# Patient Record
Sex: FEMALE | Race: WHITE | NOT HISPANIC OR LATINO | URBAN - METROPOLITAN AREA
[De-identification: names, ages, dates, MRNs, and addresses within clinical notes are randomized per-mention and may not be internally consistent; named-entity substitution may affect disease eponyms.]

---

## 2017-11-01 NOTE — PROCEDURE NOTE: CLINICAL
PROCEDURE NOTE: Eylea #1 OD. Diagnosis: Neovascular AMD with Active CNV. Prep: Betadine Drops and Betadine Scrub. Prior to injection, risks/benefits/alternatives discussed including corneal abrasion, infection, loss of vision, hemorrhage, cataract, glaucoma, retinal tears or detachment. A written consent is on file, and the need for today’s injection was discussed and the patient is understanding and wishes to proceed. The entire vial of Eylea was drawn up into a syringe. The opened vial, remaining drug, and filtered needle were disposed of in a certified biohazard container. Betadine prep was performed. Topical anesthesia was induced with Alcaine. 4% lidocaine pledge. A lid speculum was used. A short 30g needle on a 1cc syringe was used with product that that had previously been prepared under sterile conditions. Injection site: 3-4 mm from the limbus. The used syringe/needle was transferred to a biohazard container. Lid speculum removed. Mask worn during procedure. Patient tolerated procedure well. Count fingers vision was verified. There were no complications. Patient was given the standard instruction sheet. Patient given office phone number/answering service number and advised to call immediately should there be loss of vision or pain, or should they have any other questions or concerns. Alyson Amin

## 2018-02-20 NOTE — PROCEDURE NOTE: CLINICAL
PROCEDURE NOTE: Eylea #2 OD. Diagnosis: Neovascular AMD with Active CNV. Prep: Betadine Drops and Betadine Scrub. Prior to injection, risks/benefits/alternatives discussed including corneal abrasion, infection, loss of vision, hemorrhage, cataract, glaucoma, retinal tears or detachment. A written consent is on file, and the need for today’s injection was discussed and the patient is understanding and wishes to proceed. The entire vial of Eylea was drawn up into a syringe. The opened vial, remaining drug, and filtered needle were disposed of in a certified biohazard container. Betadine prep was performed. Topical anesthesia was induced with Alcaine. 4% lidocaine pledge. A lid speculum was used. A short 30g needle on a 1cc syringe was used with product that that had previously been prepared under sterile conditions. Injection site: 3-4 mm from the limbus. The used syringe/needle was transferred to a biohazard container. Lid speculum removed. Mask worn during procedure. Patient tolerated procedure well. Count fingers vision was verified. There were no complications. Patient was given the standard instruction sheet. Patient given office phone number/answering service number and advised to call immediately should there be loss of vision or pain, or should they have any other questions or concerns. Aniceto Yates

## 2018-02-20 NOTE — PATIENT DISCUSSION
stable, Recommended Eylea# 2 injection today. r/b/as  discussed with pt, pt elects to proceed  today.

## 2018-04-03 NOTE — PATIENT DISCUSSION
stable, Recommended Eylea# 2 injection today. r/b/as  discussed with pt, pt elects to proceed  today. NO NEW srf/IRF STABLE PED.

## 2018-04-03 NOTE — PROCEDURE NOTE: CLINICAL
PROCEDURE NOTE: Eylea #3 OD. Diagnosis: Neovascular AMD with Active CNV. Prep: Betadine Drops and Betadine Scrub. Prior to injection, risks/benefits/alternatives discussed including corneal abrasion, infection, loss of vision, hemorrhage, cataract, glaucoma, retinal tears or detachment. A written consent is on file, and the need for today’s injection was discussed and the patient is understanding and wishes to proceed. The entire vial of Eylea was drawn up into a syringe. The opened vial, remaining drug, and filtered needle were disposed of in a certified biohazard container. Betadine prep was performed. Topical anesthesia was induced with Alcaine. 4% lidocaine pledge. A lid speculum was used. A short 30g needle on a 1cc syringe was used with product that that had previously been prepared under sterile conditions. Injection site: 3-4 mm from the limbus. The used syringe/needle was transferred to a biohazard container. Lid speculum removed. Mask worn during procedure. Patient tolerated procedure well. Count fingers vision was verified. There were no complications. Patient was given the standard instruction sheet. Patient given office phone number/answering service number and advised to call immediately should there be loss of vision or pain, or should they have any other questions or concerns. Chela Negron

## 2018-05-15 NOTE — PROCEDURE NOTE: CLINICAL
PROCEDURE NOTE: Eylea #4 OD. Diagnosis: Neovascular AMD with Active CNV. Prior to injection, risks/benefits/alternatives discussed including corneal abrasion, infection, loss of vision, hemorrhage, cataract, glaucoma, retinal tears or detachment. A written consent is on file, and the need for today’s injection was discussed and the patient is understanding and wishes to proceed. The entire vial of Eylea was drawn up into a syringe. The opened vial, remaining drug, and filtered needle were disposed of in a certified biohazard container. Betadine prep was performed. Topical anesthesia was induced with Alcaine. 4% lidocaine pledge. A lid speculum was used. A short 30g needle on a 1cc syringe was used with product that that had previously been prepared under sterile conditions. Injection site: 3-4 mm from the limbus. The used syringe/needle was transferred to a biohazard container. Lid speculum removed. Mask worn during procedure. Patient tolerated procedure well. Count fingers vision was verified. There were no complications. Patient was given the standard instruction sheet. Patient given office phone number/answering service number and advised to call immediately should there be loss of vision or pain, or should they have any other questions or concerns. Chela Negron

## 2018-05-15 NOTE — PATIENT DISCUSSION
Matheus #4 injection recommended today after discussion of benefits, risks, alternatives. The patient elects to proceed. The injection was administered without complication. Post-injection instructions were reviewed and understood by the patient.

## 2018-05-15 NOTE — PATIENT DISCUSSION
No new subretinal or intraretinal fluid seen on today's examination and diagnostics. We recommended extending injection interval out to 8-9 weeks.

## 2018-07-10 NOTE — PATIENT DISCUSSION
Leroya #5 injection recommended today after discussion of benefits, risks, alternatives. The patient elects to proceed. The injection was administered without complication. Post-injection instructions were reviewed and understood by the patient.

## 2018-07-10 NOTE — PATIENT DISCUSSION
No new subretinal or intraretinal fluid seen on today's examination and diagnostics. We recommended extending injection interval out to 10 weeks.

## 2018-07-10 NOTE — PROCEDURE NOTE: CLINICAL
PROCEDURE NOTE: Eylea #5 OD. Diagnosis: Neovascular AMD with Active CNV. Prep: Betadine Drops and Betadine Scrub. Prior to injection, risks/benefits/alternatives discussed including corneal abrasion, infection, loss of vision, hemorrhage, cataract, glaucoma, retinal tears or detachment. A written consent is on file, and the need for today’s injection was discussed and the patient is understanding and wishes to proceed. The entire vial of Eylea was drawn up into a syringe. The opened vial, remaining drug, and filtered needle were disposed of in a certified biohazard container. Betadine prep was performed. Topical anesthesia was induced with Alcaine. 4% lidocaine pledge. A lid speculum was used. A short 30g needle on a 1cc syringe was used with product that that had previously been prepared under sterile conditions. Injection site: 3-4 mm from the limbus. The used syringe/needle was transferred to a biohazard container. Lid speculum removed. Mask worn during procedure. Patient tolerated procedure well. Count fingers vision was verified. There were no complications. Patient was given the standard instruction sheet. Patient given office phone number/answering service number and advised to call immediately should there be loss of vision or pain, or should they have any other questions or concerns. Sandrine Palma

## 2018-09-18 NOTE — PATIENT DISCUSSION
Eylea  injection recommended today after discussion of benefits, risks, alternatives. The patient elects to proceed. The injection was administered without complication. Post-injection instructions were reviewed and understood by the patient.

## 2018-09-18 NOTE — PATIENT DISCUSSION
No new subretinal or intraretinal fluid seen on today's examination and diagnostics. We recommended continuing treatment interval at 9-10 weeks.  Goal is to maintain current level of vision.

## 2018-09-18 NOTE — PROCEDURE NOTE: CLINICAL
PROCEDURE NOTE: Eylea OD. Diagnosis: Neovascular AMD with Active CNV. Prior to injection, risks/benefits/alternatives discussed including corneal abrasion, infection, loss of vision, hemorrhage, cataract, glaucoma, retinal tears or detachment. A written consent is on file, and the need for today’s injection was discussed and the patient is understanding and wishes to proceed. The entire vial of Eylea was drawn up into a syringe. The opened vial, remaining drug, and filtered needle were disposed of in a certified biohazard container. Betadine prep was performed. Topical anesthesia was induced with Alcaine. 4% lidocaine pledge. A lid speculum was used. A short 30g needle on a 1cc syringe was used with product that that had previously been prepared under sterile conditions. Injection site: 3-4 mm from the limbus. The used syringe/needle was transferred to a biohazard container. Lid speculum removed. Mask worn during procedure. Patient tolerated procedure well. Count fingers vision was verified. There were no complications. Patient was given the standard instruction sheet. Patient given office phone number/answering service number and advised to call immediately should there be loss of vision or pain, or should they have any other questions or concerns. [facility]

## 2018-11-07 NOTE — PROCEDURE NOTE: SURGICAL
<p>Prior to commencing surgery patient identification, surgical procedure, site, and side were confirmed by Dr. Allyson Guallpa. Following topical proparacaine anesthesia, the patient was positioned at the YAG laser, a contact lens coupled to the cornea with methylcellulose and an axial posterior capsulotomy performed without complication using 2.8 Mj x 68. Excess methylcellulose was washed from the eye, one drop of Alphagan was instilled and the patient returned to the holding area having tolerated the procedure well and without complication. </p><p>MRN:[id number]</p>

## 2018-11-27 NOTE — PATIENT DISCUSSION
No new subretinal or intraretinal fluid seen on today's examination and diagnostics. We recommended continuing treatment and extending interval at 11-12 weeks.  Goal is to maintain current level of vision.

## 2018-11-27 NOTE — PROCEDURE NOTE: CLINICAL
PROCEDURE NOTE: Eylea 2mg OD. Diagnosis: Neovascular AMD with Active CNV. Prior to injection, risks/benefits/alternatives discussed including corneal abrasion, infection, loss of vision, hemorrhage, cataract, glaucoma, retinal tears or detachment. A written consent is on file, and the need for today’s injection was discussed and the patient is understanding and wishes to proceed. The entire vial of Eylea was drawn up into a syringe. The opened vial, remaining drug, and filtered needle were disposed of in a certified biohazard container. Betadine prep was performed. Topical anesthesia was induced with Alcaine. 4% lidocaine pledge. A lid speculum was used. A short 30g needle on a 1cc syringe was used with product that that had previously been prepared under sterile conditions. Injection site: 3-4 mm from the limbus. The used syringe/needle was transferred to a biohazard container. Lid speculum removed. Mask worn during procedure. Patient tolerated procedure well. Count fingers vision was verified. There were no complications. Patient was given the standard instruction sheet. Patient given office phone number/answering service number and advised to call immediately should there be loss of vision or pain, or should they have any other questions or concerns. Deirdre Fan

## 2019-02-19 NOTE — PROCEDURE NOTE: CLINICAL
PROCEDURE NOTE: Eylea 2mg OD. Diagnosis: Neovascular AMD with Active CNV. Prior to injection, risks/benefits/alternatives discussed including corneal abrasion, infection, loss of vision, hemorrhage, cataract, glaucoma, retinal tears or detachment. A written consent is on file, and the need for today’s injection was discussed and the patient is understanding and wishes to proceed. The entire vial of Eylea was drawn up into a syringe. The opened vial, remaining drug, and filtered needle were disposed of in a certified biohazard container. Betadine prep was performed. Topical anesthesia was induced with Alcaine. 4% lidocaine pledge. A lid speculum was used. A short 30g needle on a 1cc syringe was used with product that that had previously been prepared under sterile conditions. Injection site: 3-4 mm from the limbus. The used syringe/needle was transferred to a biohazard container. Lid speculum removed. Mask worn during procedure. Patient tolerated procedure well. Count fingers vision was verified. There were no complications. Patient was given the standard instruction sheet. Patient given office phone number/answering service number and advised to call immediately should there be loss of vision or pain, or should they have any other questions or concerns. Anjelica Ramos

## 2019-02-19 NOTE — PATIENT DISCUSSION
Increased intraretinal fluid seen on today's examination and diagnostics, worse. We recommended continuing treatment today and decrease interval to 8-9 weeks, possible repeat injection at that time.

## 2019-02-19 NOTE — PATIENT DISCUSSION
Discussed in detail re: nature of condition, dry vs wet AMD, AREDS.  No evidnece of wet conversion, no SRF/IRF.

## 2019-04-25 NOTE — PATIENT DISCUSSION
Pt edu inactive at this time, no edema or hemorrhage and with comparison from previous OCT's in the past, has been inactive for awhile. Edu pt reason for decreased VA is due to atrophy from underlying Dry AMD which unfortunately cannot be treated. Edu pt that too many treatments can also increase atrophy. Treatment not indicated at this time. Will monitor for now and re-evaluate in 4 weeks. If becomes active again will then consider treatment. Will see pt in [place] for possible same day treatment. Advised pt to call with any vision changes.

## 2019-04-25 NOTE — PATIENT DISCUSSION
May need possible Rx glasses. Advised pt to follow with [address] [place] [address] for possible glasses.

## 2019-06-03 NOTE — PATIENT DISCUSSION
May need possible Rx glasses. Advised pt to follow with [address] [place] [address] for possible glasses.

## 2019-06-03 NOTE — PATIENT DISCUSSION
Pt edu still inactive at this time, no edema or hemorrhage. Edu pt reason for decreased VA is due to atrophy from underlying Dry AMD which unfortunately cannot be treated. Edu pt that too many treatments can also increase atrophy. Treatment not indicated at this time. Will monitor for now and re-evaluate in 6 weeks when pt returns to the [facility], pt prefers [address]. Aware no same day treatment. If becomes active again will then consider treatment. Advised pt to call with any vision changes.

## 2019-07-18 NOTE — PATIENT DISCUSSION
Pt edu still inactive at this time and has been inactive for quite sometime, no edema or hemorrhage. Edu pt reason for decreased VA is due to atrophy from underlying Dry AMD which unfortunately cannot be treated. Edu pt that too many treatments can also increase atrophy. Treatment not indicated at this time. Aware no same day treatment. If becomes active again will then consider treatment. Advised pt to call with any vision changes.

## 2019-07-18 NOTE — PATIENT DISCUSSION
May need possible Rx glasses. Advised pt to follow with [address] [place] [address] for possible glasses.

## 2019-09-05 NOTE — PATIENT DISCUSSION
May need possible Rx glasses. Advised pt to follow with [address] [place] [address] for possible glasses.

## 2019-09-05 NOTE — PATIENT DISCUSSION
Pt edu still inactive at this time and has been inactive for quite some time, no edema or hemorrhage. Edu pt reason for decreased VA is due to atrophy from underlying Dry AMD which unfortunately cannot be treated. Treatment not indicated at this time. If becomes active again will then consider treatment.  Advised pt to call with any vision changes.  Due to AMD will need magnification and BCVA will be limited in glasses from AMD.

## 2019-11-14 NOTE — PATIENT DISCUSSION
Pt edu active again with fluid seen on OCT but not clinically.  Decision to treat was made based on today's clinical findings. Intravitreal anti-VEGF therapy was recommended. Recommended Beovu injection in 2 weeks.  Series of 3 x 4 weeks apart.

## 2019-11-14 NOTE — PATIENT DISCUSSION
May need possible Rx glasses. Advised pt to follow with [address] [place] [address] for possible glasses.

## 2019-11-20 NOTE — PATIENT DISCUSSION
May need possible Rx glasses. Advised pt to follow with [address] [place] [address] for possible glasses.

## 2019-11-20 NOTE — PROCEDURE NOTE: CLINICAL
PROCEDURE NOTE: Intravitreal Beovu (1 of 3) #1 OD. Diagnosis: Neovascular AMD with Active CNV. Anesthesia: Topical. Prep: Betadine Drops and Betadine Scrub. Prior to the original injection, risks/benefits/alternatives discussed including infection, loss of vision, hemorrhage, cataract, glaucoma, retinal tears or detachment. The patient wished to proceed with treatment. Betadine prep was performed. Topical anesthesia was induced with Alcaine. A drop of Povidone-iodine 5% ophthalmic solution was instilled over the injection site and in the inferior fornix. A single use prefilled syringe of intravitreal Beovu 6mg/0.05ml was used and excess discarded. The needle was passed 3.0 mm posterior to the limbus in pseudophakic patients, and 3.5 mm posterior to the limbus in phakic patients. Patient tolerated procedure well. The eye was irrigated with sterile irrigating solution. CF vision checked. There were no complications. Post procedure instructions given. Leelee Smith

## 2019-12-18 NOTE — PROCEDURE NOTE: CLINICAL
PROCEDURE NOTE: Intravitreal Beovu (2 of 3) #2 OD. Diagnosis: Neovascular AMD with Active CNV. Anesthesia: Topical. Prep: Betadine Drops and Betadine Scrub. Prior to the original injection, risks/benefits/alternatives discussed including infection, loss of vision, hemorrhage, cataract, glaucoma, retinal tears or detachment. The patient wished to proceed with treatment. Betadine prep was performed. Topical anesthesia was induced with Alcaine. A drop of Povidone-iodine 5% ophthalmic solution was instilled over the injection site and in the inferior fornix. A single use prefilled syringe of intravitreal Beovu 6mg/0.05ml was used and excess discarded. The needle was passed 3.0 mm posterior to the limbus in pseudophakic patients, and 3.5 mm posterior to the limbus in phakic patients. Patient tolerated procedure well. The eye was irrigated with sterile irrigating solution. CF vision checked. There were no complications. Post procedure instructions given. Judy Paul

## 2019-12-18 NOTE — PATIENT DISCUSSION
May need possible Rx glasses. Advised pt to follow with [address] [place] [address] for possible glasses.

## 2020-01-15 NOTE — PATIENT DISCUSSION
May need possible Rx glasses. Advised pt to follow with [address] [place] [address] for possible glasses.

## 2020-01-15 NOTE — PATIENT DISCUSSION
hx htn, labile BP , slight AMS, has been self medicating with herbals/alternative supplements. bp OK today, decreased vision OS without change in OCT , possible visual field loss. hx carotid endarterectomy. send to hospital for stroke workup. patient states this has been constant for two weeks. patient states she drank some wine at breakfast (11% alcohol) to raise her blood pressure.  patient going with friend Ago to [facility].

## 2020-01-15 NOTE — PATIENT DISCUSSION
S/P Michael Guallpa. Breathing spontaneous and unlabored. Breath sounds clear and equal bilaterally with regular rhythm.

## 2020-01-15 NOTE — PATIENT DISCUSSION
**Defer scheduled intravitreal Beovu today due to #1-3.  RTC 1 week for exam and Beovu (3 of 3). **.

## 2020-01-15 NOTE — PATIENT DISCUSSION
See #1-2.  No new activity seen on exam and testing today.  Pt has Wet AMD and do rec cont. with scheduled Beovu but hold off due to #1-2.

## 2020-01-29 NOTE — PATIENT DISCUSSION
Pt here today for Beovu #3 (3 of 3) injection.  The injection was administered without complication. Post-injection instructions were reviewed and understood by the patient.

## 2020-01-29 NOTE — PATIENT DISCUSSION
hx htn, labile BP , slight AMS, has been self medicating with herbals/alternative supplements. bp OK today, decreased vision OS without change in OCT , possible visual field loss. hx carotid endarterectomy. send to hospital for stroke workup. patient states this has been constant for two weeks. patient states she drank some wine at breakfast (11% alcohol) to raise her blood pressure.  patient going with friend Ago to [facility].

## 2020-01-29 NOTE — PROCEDURE NOTE: CLINICAL
PROCEDURE NOTE: Intravitreal Beovu (3 of 3) #3 OD. Diagnosis: Neovascular AMD with Active CNV. Anesthesia: Topical. Prep: Betadine Drops and Betadine Scrub. Prior to the original injection, risks/benefits/alternatives discussed including infection, loss of vision, hemorrhage, cataract, glaucoma, retinal tears or detachment. The patient wished to proceed with treatment. Betadine prep was performed. Topical anesthesia was induced with Alcaine. A drop of Povidone-iodine 5% ophthalmic solution was instilled over the injection site and in the inferior fornix. A single use prefilled syringe of intravitreal Beovu 6mg/0.05ml was used and excess discarded. The needle was passed 3.0 mm posterior to the limbus in pseudophakic patients, and 3.5 mm posterior to the limbus in phakic patients. Patient tolerated procedure well. The eye was irrigated with sterile irrigating solution. CF vision checked. There were no complications. Post procedure instructions given. Lot #[id number], exp 11/30/2020.

## 2020-01-29 NOTE — PATIENT DISCUSSION
Reviewed records from [facility], negative for stroke, negative for temporal arteritis.

## 2020-02-27 NOTE — PATIENT DISCUSSION
NO new fluid/blood.  Good response to Beovu.  at this time rec observation and consider tx if worsens.  RTC in 2 months, may order tx at that date.

## 2020-02-27 NOTE — PATIENT DISCUSSION
May need possible Rx glasses. Advised pt to follow with [address] [place] [address] for possible glasses.

## 2020-02-27 NOTE — PATIENT DISCUSSION
Reviewed records from [facility], negative for stroke, negative for temporal arteritis.

## 2020-03-11 ENCOUNTER — IMPORTED ENCOUNTER (OUTPATIENT)
Dept: URBAN - METROPOLITAN AREA CLINIC 9 | Facility: CLINIC | Age: 55
End: 2020-03-11

## 2021-10-15 ENCOUNTER — LASIK CONSULTATION (OUTPATIENT)
Dept: URBAN - METROPOLITAN AREA CLINIC 14 | Facility: CLINIC | Age: 56
End: 2021-10-15

## 2021-10-15 DIAGNOSIS — H52.03: ICD-10-CM

## 2021-10-15 PROCEDURE — 99499RLE REFRACTIVE CONSULT/RLE

## 2021-10-15 ASSESSMENT — VISUAL ACUITY
OD_SC: 20/80
OS_SC: 20/80
OS_GLARE: 20/50
OD_GLARE: 20/60
OD_CC: 20/25
OS_CC: 20/25

## 2021-10-15 ASSESSMENT — PACHYMETRY
OD_CT_UM: 575
OS_CT_UM: 571

## 2021-10-16 ASSESSMENT — VISUAL ACUITY
OD_SC: 20/20 SN
OS_SC: 20/20 SN

## 2021-10-25 ENCOUNTER — ESTABLISHED COMPREHENSIVE EXAM (OUTPATIENT)
Dept: URBAN - METROPOLITAN AREA CLINIC 14 | Facility: CLINIC | Age: 56
End: 2021-10-25

## 2021-10-25 DIAGNOSIS — H52.03: ICD-10-CM

## 2021-10-25 PROCEDURE — 99211PRE PRE OP VISIT

## 2021-10-25 ASSESSMENT — KERATOMETRY
OD_K1POWER_DIOPTERS: 42.00
OD_AXISANGLE2_DEGREES: 98
OS_K1POWER_DIOPTERS: 42.25
OD_AXISANGLE_DEGREES: 08
OS_K2POWER_DIOPTERS: 43
OD_AXISANGLE_DEGREES: 5
OS_AXISANGLE2_DEGREES: 84
OS_K2POWER_DIOPTERS: 43.25
OD_AXISANGLE2_DEGREES: 95
OS_AXISANGLE_DEGREES: 174
OS_AXISANGLE2_DEGREES: 84
OD_K1POWER_DIOPTERS: 42.25
OD_K2POWER_DIOPTERS: 43.25
OD_K1POWER_DIOPTERS: 42.25
OD_AXISANGLE2_DEGREES: 95
OD_AXISANGLE_DEGREES: 5
OS_K1POWER_DIOPTERS: 42.25
OS_K2POWER_DIOPTERS: 43
OD_K2POWER_DIOPTERS: 43.25
OS_AXISANGLE_DEGREES: 174

## 2021-10-25 ASSESSMENT — TONOMETRY
OD_IOP_MMHG: 11
OS_IOP_MMHG: 10

## 2021-10-25 ASSESSMENT — VISUAL ACUITY
OS_SC: 20/80
OD_GLARE: 20/60
OS_GLARE: 20/50
OD_SC: 20/80

## 2021-11-03 ENCOUNTER — POST-OP (OUTPATIENT)
Dept: URBAN - METROPOLITAN AREA CLINIC 14 | Facility: CLINIC | Age: 56
End: 2021-11-03

## 2021-11-03 DIAGNOSIS — Z96.1: ICD-10-CM

## 2021-11-03 PROCEDURE — 99024 POSTOP FOLLOW-UP VISIT: CPT

## 2021-11-03 ASSESSMENT — KERATOMETRY
OD_AXISANGLE_DEGREES: 08
OS_AXISANGLE_DEGREES: 174
OD_K1POWER_DIOPTERS: 42.00
OD_K1POWER_DIOPTERS: 42.25
OD_AXISANGLE_DEGREES: 5
OS_K1POWER_DIOPTERS: 42.25
OS_K2POWER_DIOPTERS: 43
OS_AXISANGLE2_DEGREES: 84
OD_K2POWER_DIOPTERS: 43.25
OD_AXISANGLE2_DEGREES: 95
OD_AXISANGLE2_DEGREES: 98
OS_K2POWER_DIOPTERS: 43.25

## 2021-11-03 ASSESSMENT — TONOMETRY
OS_IOP_MMHG: 15
OD_IOP_MMHG: 16

## 2021-11-03 ASSESSMENT — VISUAL ACUITY
OS_SC: 20/25
OS_SC: J2
OD_BCVA: 20/25

## 2021-11-10 ENCOUNTER — 1 DAY POST-OP (OUTPATIENT)
Dept: URBAN - METROPOLITAN AREA CLINIC 14 | Facility: CLINIC | Age: 56
End: 2021-11-10

## 2021-11-10 DIAGNOSIS — Z96.1: ICD-10-CM

## 2021-11-10 PROCEDURE — 99024 POSTOP FOLLOW-UP VISIT: CPT

## 2021-11-10 ASSESSMENT — KERATOMETRY
OS_K2POWER_DIOPTERS: 43.25
OD_K1POWER_DIOPTERS: 42.25
OS_AXISANGLE2_DEGREES: 84
OS_K1POWER_DIOPTERS: 42.25
OD_AXISANGLE2_DEGREES: 95
OD_K1POWER_DIOPTERS: 42.00
OD_AXISANGLE2_DEGREES: 98
OS_K2POWER_DIOPTERS: 43
OS_AXISANGLE_DEGREES: 174
OD_AXISANGLE_DEGREES: 08
OD_K2POWER_DIOPTERS: 43.25
OD_AXISANGLE_DEGREES: 5

## 2021-11-10 ASSESSMENT — VISUAL ACUITY
OU_SC: J1+
OU_OTHER: 20/20 INT
OS_SC: 20/30
OD_SC: 20/40

## 2021-11-10 ASSESSMENT — TONOMETRY
OS_IOP_MMHG: 12
OD_IOP_MMHG: 13

## 2021-12-09 ENCOUNTER — POST-OP (OUTPATIENT)
Dept: URBAN - METROPOLITAN AREA CLINIC 14 | Facility: CLINIC | Age: 56
End: 2021-12-09

## 2021-12-09 DIAGNOSIS — Z96.1: ICD-10-CM

## 2021-12-09 PROCEDURE — 99024 POSTOP FOLLOW-UP VISIT: CPT

## 2021-12-09 ASSESSMENT — VISUAL ACUITY
OS_SC: 20/20-1
OU_SC: 20/20-1
OD_SC: 20/25+2

## 2021-12-09 ASSESSMENT — KERATOMETRY
OD_K2POWER_DIOPTERS: 43.25
OS_AXISANGLE_DEGREES: 174
OD_AXISANGLE2_DEGREES: 98
OS_K2POWER_DIOPTERS: 43
OD_AXISANGLE2_DEGREES: 95
OD_K1POWER_DIOPTERS: 42.00
OS_K2POWER_DIOPTERS: 43.25
OD_AXISANGLE_DEGREES: 5
OD_K1POWER_DIOPTERS: 42.25
OD_AXISANGLE_DEGREES: 08
OS_K1POWER_DIOPTERS: 42.25
OS_AXISANGLE2_DEGREES: 84

## 2021-12-09 ASSESSMENT — TONOMETRY
OS_IOP_MMHG: 08
OD_IOP_MMHG: 09

## 2022-02-09 ENCOUNTER — POST-OP (OUTPATIENT)
Dept: URBAN - METROPOLITAN AREA CLINIC 14 | Facility: CLINIC | Age: 57
End: 2022-02-09

## 2022-02-09 DIAGNOSIS — Z98.890: ICD-10-CM

## 2022-02-09 DIAGNOSIS — Z96.1: ICD-10-CM

## 2022-02-09 PROCEDURE — 99024 POSTOP FOLLOW-UP VISIT: CPT

## 2022-02-09 ASSESSMENT — KERATOMETRY
OD_AXISANGLE_DEGREES: 08
OS_K2POWER_DIOPTERS: 43.25
OS_AXISANGLE_DEGREES: 174
OD_K2POWER_DIOPTERS: 43.25
OD_K1POWER_DIOPTERS: 42.25
OD_K1POWER_DIOPTERS: 42.00
OD_AXISANGLE2_DEGREES: 98
OS_K2POWER_DIOPTERS: 43
OD_AXISANGLE2_DEGREES: 95
OS_K1POWER_DIOPTERS: 42.25
OS_AXISANGLE2_DEGREES: 84
OD_AXISANGLE_DEGREES: 5

## 2022-02-09 ASSESSMENT — VISUAL ACUITY
OD_SC: 20/20
OS_SC: 20/20

## 2022-02-25 ENCOUNTER — ESTABLISHED PATIENT (OUTPATIENT)
Dept: URBAN - METROPOLITAN AREA CLINIC 14 | Facility: CLINIC | Age: 57
End: 2022-02-25

## 2022-02-25 DIAGNOSIS — H02.832: ICD-10-CM

## 2022-02-25 DIAGNOSIS — H02.835: ICD-10-CM

## 2022-02-25 PROCEDURE — 92285 EXTERNAL OCULAR PHOTOGRAPHY: CPT

## 2022-02-25 PROCEDURE — 99211NC NO CHARGE VISIT

## 2022-02-25 ASSESSMENT — KERATOMETRY
OS_AXISANGLE_DEGREES: 174
OD_K1POWER_DIOPTERS: 42.25
OS_K1POWER_DIOPTERS: 42.25
OS_AXISANGLE2_DEGREES: 84
OD_K2POWER_DIOPTERS: 43.25
OS_K2POWER_DIOPTERS: 43
OD_AXISANGLE_DEGREES: 08
OD_AXISANGLE2_DEGREES: 95
OD_AXISANGLE_DEGREES: 5
OS_K2POWER_DIOPTERS: 43.25
OD_K1POWER_DIOPTERS: 42.00
OD_AXISANGLE2_DEGREES: 98

## 2022-02-25 ASSESSMENT — VISUAL ACUITY
OS_SC: 20/20
OD_SC: 20/20

## 2022-06-07 ENCOUNTER — CLINICAL TRIAL VISIT (OUTPATIENT)
Dept: URBAN - METROPOLITAN AREA CLINIC 14 | Facility: CLINIC | Age: 57
End: 2022-06-07

## 2022-06-07 DIAGNOSIS — Z96.1: ICD-10-CM

## 2022-06-07 PROCEDURE — 99199CT CLINICAL TRIAL VISIT

## 2022-06-24 ENCOUNTER — POST-OP (OUTPATIENT)
Dept: URBAN - METROPOLITAN AREA CLINIC 14 | Facility: CLINIC | Age: 57
End: 2022-06-24

## 2022-06-24 DIAGNOSIS — H02.835: ICD-10-CM

## 2022-06-24 DIAGNOSIS — H02.832: ICD-10-CM

## 2022-06-24 DIAGNOSIS — Z98.890: ICD-10-CM

## 2022-06-24 PROCEDURE — 99024 POSTOP FOLLOW-UP VISIT: CPT

## 2022-06-24 ASSESSMENT — VISUAL ACUITY
OD_SC: 20/20
OS_SC: 20/20

## 2022-08-10 ENCOUNTER — POST-OP (OUTPATIENT)
Dept: URBAN - METROPOLITAN AREA CLINIC 14 | Facility: CLINIC | Age: 57
End: 2022-08-10

## 2022-08-10 DIAGNOSIS — Z98.890: ICD-10-CM

## 2022-08-10 DIAGNOSIS — H02.832: ICD-10-CM

## 2022-08-10 DIAGNOSIS — H02.835: ICD-10-CM

## 2022-08-10 PROCEDURE — 99024 POSTOP FOLLOW-UP VISIT: CPT

## 2022-08-10 ASSESSMENT — VISUAL ACUITY
OS_SC: 20/20
OD_SC: 20/20

## 2023-01-19 NOTE — PATIENT DISCUSSION
Detail Level: Generalized Low risk for malignant transformation. Lesion appears to have been present for many years. Detail Level: Simple

## 2025-03-13 ENCOUNTER — COMPREHENSIVE EXAM (OUTPATIENT)
Age: 60
End: 2025-03-13

## 2025-03-13 DIAGNOSIS — H52.03: ICD-10-CM

## 2025-03-13 DIAGNOSIS — Z96.1: ICD-10-CM

## 2025-03-13 DIAGNOSIS — H26.493: ICD-10-CM

## 2025-03-13 DIAGNOSIS — H18.513: ICD-10-CM

## 2025-03-13 DIAGNOSIS — Z01.00: ICD-10-CM

## 2025-03-13 PROCEDURE — 92014 COMPRE OPH EXAM EST PT 1/>: CPT
